# Patient Record
Sex: MALE | Race: WHITE | NOT HISPANIC OR LATINO | URBAN - METROPOLITAN AREA
[De-identification: names, ages, dates, MRNs, and addresses within clinical notes are randomized per-mention and may not be internally consistent; named-entity substitution may affect disease eponyms.]

---

## 2019-05-01 DIAGNOSIS — Z00.00 ROUTINE GENERAL MEDICAL EXAMINATION AT A HEALTH CARE FACILITY: Primary | ICD-10-CM

## 2019-05-10 ENCOUNTER — CLINICAL SUPPORT (OUTPATIENT)
Dept: INTERNAL MEDICINE | Facility: CLINIC | Age: 39
End: 2019-05-10
Payer: COMMERCIAL

## 2019-05-10 ENCOUNTER — HOSPITAL ENCOUNTER (OUTPATIENT)
Dept: RADIOLOGY | Facility: HOSPITAL | Age: 39
Discharge: HOME | End: 2019-05-10
Attending: INTERNAL MEDICINE
Payer: COMMERCIAL

## 2019-05-10 ENCOUNTER — OFFICE VISIT (OUTPATIENT)
Dept: INTERNAL MEDICINE | Facility: CLINIC | Age: 39
End: 2019-05-10
Payer: COMMERCIAL

## 2019-05-10 ENCOUNTER — HOSPITAL ENCOUNTER (OUTPATIENT)
Dept: CARDIOLOGY | Facility: CLINIC | Age: 39
Discharge: HOME | End: 2019-05-10
Payer: COMMERCIAL

## 2019-05-10 VITALS
SYSTOLIC BLOOD PRESSURE: 120 MMHG | HEART RATE: 52 BPM | HEIGHT: 73 IN | BODY MASS INDEX: 25.16 KG/M2 | WEIGHT: 189.81 LBS | OXYGEN SATURATION: 99 % | DIASTOLIC BLOOD PRESSURE: 80 MMHG

## 2019-05-10 DIAGNOSIS — Z00.00 ROUTINE GENERAL MEDICAL EXAMINATION AT A HEALTH CARE FACILITY: Primary | ICD-10-CM

## 2019-05-10 DIAGNOSIS — Z00.00 ROUTINE GENERAL MEDICAL EXAMINATION AT A HEALTH CARE FACILITY: ICD-10-CM

## 2019-05-10 DIAGNOSIS — Z00.00 ANNUAL PHYSICAL EXAM: Primary | ICD-10-CM

## 2019-05-10 LAB
ALBUMIN SERPL BCP-MCNC: 4.4 G/DL (ref 3.5–5.2)
ALP SERPL-CCNC: 64 U/L (ref 55–135)
ALT SERPL W/O P-5'-P-CCNC: 43 U/L (ref 10–44)
ANION GAP SERPL CALC-SCNC: 11 MMOL/L (ref 8–16)
AST SERPL-CCNC: 41 U/L (ref 10–40)
BILIRUB SERPL-MCNC: 2.7 MG/DL (ref 0.1–1)
BUN SERPL-MCNC: 17 MG/DL (ref 6–20)
CALCIUM SERPL-MCNC: 10.3 MG/DL (ref 8.7–10.5)
CHLORIDE SERPL-SCNC: 108 MMOL/L (ref 95–110)
CHOLEST SERPL-MCNC: 222 MG/DL (ref 120–199)
CHOLEST/HDLC SERPL: 3 {RATIO} (ref 2–5)
CO2 SERPL-SCNC: 24 MMOL/L (ref 23–29)
CREAT SERPL-MCNC: 1.2 MG/DL (ref 0.5–1.4)
ERYTHROCYTE [DISTWIDTH] IN BLOOD BY AUTOMATED COUNT: 11.7 % (ref 11.5–14.5)
EST. GFR  (AFRICAN AMERICAN): >60 ML/MIN/1.73 M^2
EST. GFR  (NON AFRICAN AMERICAN): >60 ML/MIN/1.73 M^2
ESTIMATED AVG GLUCOSE: 91 MG/DL (ref 68–131)
GLUCOSE SERPL-MCNC: 89 MG/DL (ref 70–110)
HBA1C MFR BLD HPLC: 4.8 % (ref 4–5.6)
HCT VFR BLD AUTO: 45.4 % (ref 40–54)
HDLC SERPL-MCNC: 74 MG/DL (ref 40–75)
HDLC SERPL: 33.3 % (ref 20–50)
HGB BLD-MCNC: 15.7 G/DL (ref 14–18)
HIV 1+2 AB+HIV1 P24 AG SERPL QL IA: NEGATIVE
LDLC SERPL CALC-MCNC: 132.8 MG/DL (ref 63–159)
MCH RBC QN AUTO: 33.7 PG (ref 27–31)
MCHC RBC AUTO-ENTMCNC: 34.6 G/DL (ref 32–36)
MCV RBC AUTO: 97 FL (ref 82–98)
NONHDLC SERPL-MCNC: 148 MG/DL
PLATELET # BLD AUTO: 223 K/UL (ref 150–350)
PMV BLD AUTO: 9.9 FL (ref 9.2–12.9)
POTASSIUM SERPL-SCNC: 5.2 MMOL/L (ref 3.5–5.1)
PROT SERPL-MCNC: 7.3 G/DL (ref 6–8.4)
RBC # BLD AUTO: 4.66 M/UL (ref 4.6–6.2)
SODIUM SERPL-SCNC: 143 MMOL/L (ref 136–145)
TRIGL SERPL-MCNC: 76 MG/DL (ref 30–150)
TSH SERPL DL<=0.005 MIU/L-ACNC: 1.27 UIU/ML (ref 0.4–4)
WBC # BLD AUTO: 3.18 K/UL (ref 3.9–12.7)

## 2019-05-10 PROCEDURE — 99999 PR PBB SHADOW E&M-EST. PATIENT-LVL III: ICD-10-PCS | Mod: PBBFAC,,, | Performed by: INTERNAL MEDICINE

## 2019-05-10 PROCEDURE — 97750 PHYSICAL PERFORMANCE TEST: CPT | Mod: S$GLB,,, | Performed by: INTERNAL MEDICINE

## 2019-05-10 PROCEDURE — 99999 PR PBB SHADOW E&M-EST. PATIENT-LVL III: CPT | Mod: PBBFAC,,, | Performed by: INTERNAL MEDICINE

## 2019-05-10 PROCEDURE — 99385 PR PREVENTIVE VISIT,NEW,18-39: ICD-10-PCS | Mod: S$PBB,,, | Performed by: INTERNAL MEDICINE

## 2019-05-10 PROCEDURE — 86703 HIV-1/HIV-2 1 RESULT ANTBDY: CPT

## 2019-05-10 PROCEDURE — 85027 COMPLETE CBC AUTOMATED: CPT

## 2019-05-10 PROCEDURE — 93010 EKG 12-LEAD: ICD-10-PCS | Mod: S$PBB,,, | Performed by: INTERNAL MEDICINE

## 2019-05-10 PROCEDURE — 71046 XR CHEST PA AND LATERAL: ICD-10-PCS | Mod: 26,,, | Performed by: RADIOLOGY

## 2019-05-10 PROCEDURE — 80053 COMPREHEN METABOLIC PANEL: CPT

## 2019-05-10 PROCEDURE — 71046 X-RAY EXAM CHEST 2 VIEWS: CPT | Mod: TC,FY

## 2019-05-10 PROCEDURE — 93010 ELECTROCARDIOGRAM REPORT: CPT | Mod: S$PBB,,, | Performed by: INTERNAL MEDICINE

## 2019-05-10 PROCEDURE — 71046 X-RAY EXAM CHEST 2 VIEWS: CPT | Mod: 26,,, | Performed by: RADIOLOGY

## 2019-05-10 PROCEDURE — 97750 PR PHYSICAL PERFORMANCE TEST: ICD-10-PCS | Mod: S$GLB,,, | Performed by: INTERNAL MEDICINE

## 2019-05-10 PROCEDURE — 97802 PR MED NUTR THER, 1ST, INDIV, EA 15 MIN: ICD-10-PCS | Mod: S$GLB,,, | Performed by: INTERNAL MEDICINE

## 2019-05-10 PROCEDURE — 83036 HEMOGLOBIN GLYCOSYLATED A1C: CPT

## 2019-05-10 PROCEDURE — 99385 PREV VISIT NEW AGE 18-39: CPT | Mod: S$PBB,,, | Performed by: INTERNAL MEDICINE

## 2019-05-10 PROCEDURE — 80061 LIPID PANEL: CPT

## 2019-05-10 PROCEDURE — 93005 ELECTROCARDIOGRAM TRACING: CPT | Mod: PBBFAC | Performed by: INTERNAL MEDICINE

## 2019-05-10 PROCEDURE — 84443 ASSAY THYROID STIM HORMONE: CPT

## 2019-05-10 PROCEDURE — 36415 COLL VENOUS BLD VENIPUNCTURE: CPT

## 2019-05-10 PROCEDURE — 97802 MEDICAL NUTRITION INDIV IN: CPT | Mod: S$GLB,,, | Performed by: INTERNAL MEDICINE

## 2019-05-10 NOTE — PROGRESS NOTES
Subjective:       Patient ID: Yariel Begum is a 38 y.o. male.    Chief Complaint: No chief complaint on file.    HPI   Pt. Has no significant cardiovascular or pulmonary history.    Physical Limitations:  Patient has been diagnosed with low back DDD.  Patient stated that this issue only limits him when he is not active.      Current exercise routine:  Patient currently attends a Body Pump class where he performs aerobic and full-body resistance training exercises, 2-3 days a week.  Patient does not follow any formal flexibility routine at the current time.    Goals:  Patient would like to maintain his weight and body fat % while increasing his flexibility.  Fun Facts:  Patient was very friendly and engaged.  Patient stated that he saw someone named Bill for fitness in 2012.  Patient asked questions and was very receptive to all recommendations made.      Review of Systems    Objective:     The fitness evaluation results are as follows:  D.O.S. 5/10/2019   Height (in): 72.5   Weight (lbs): 185   BMI: 24.006863   Body Fat (%): 13.12   Waist (cm): 82   Hip (cm): 105   WHR: 0.78   RBP (mmHg): 110/76   RHR (bpm): 54    Strength R (lbs)t: 136.72391    Strength Lt (lbs): 128.53637   Push-up Assessment: 38   Curl-up Assessment: 35   Flexibility Testing (cm): 15   REE (kcals): 2130       Physical Exam    Assessment:     Age/gender stratified assessment:  Resting BP: Within Normal Limits   Body Fat %: Excellent   WHR Risk Factor: Low Risk    Strength R: Average    Strength L: Average   Upper Body Endurance: Excellent   Abdominal Endurance: Average   Lower body Flexibiltiy: Needs Improvement       1. Routine general medical examination at a health care facility        Plan:       Recommended fitness guidelines:    -150 minutes of moderate intensity aerobic exercise per week or 75 minutes of vigorous intensity aerobic exercise per week.   Try to reach a minimum of 150 minutes of moderate to vigorous  intensity aerobic activity per week and 10,000 steps per day.  Incorporate some interval training to increase your heart rate for short periods of time.      -2 to 4 days per week of resistance training for each muscle group.      -Daily stretching with a hold of at least 30 seconds per muscle group.  Practice the seated hamstring stretch, demonstrated during the evaluation, daily.

## 2019-05-10 NOTE — PATIENT INSTRUCTIONS
Here are the results of your recent labs and tests.   Any significant abnormalities have been discussed and those recommendations are found in the diagnosis list on this visit summary.    Your Chest xray was: Normal  Your EKG was normal and did not show any concerning changes.     Recent Results (from the past 168 hour(s))   Comprehensive metabolic panel    Collection Time: 05/10/19  7:51 AM   Result Value Ref Range    Sodium 143 136 - 145 mmol/L    Potassium 5.2 (H) 3.5 - 5.1 mmol/L    Chloride 108 95 - 110 mmol/L    CO2 24 23 - 29 mmol/L    Glucose 89 70 - 110 mg/dL    BUN, Bld 17 6 - 20 mg/dL    Creatinine 1.2 0.5 - 1.4 mg/dL    Calcium 10.3 8.7 - 10.5 mg/dL    Total Protein 7.3 6.0 - 8.4 g/dL    Albumin 4.4 3.5 - 5.2 g/dL    Total Bilirubin 2.7 (H) 0.1 - 1.0 mg/dL    Alkaline Phosphatase 64 55 - 135 U/L    AST 41 (H) 10 - 40 U/L    ALT 43 10 - 44 U/L    Anion Gap 11 8 - 16 mmol/L    eGFR if African American >60.0 >60 mL/min/1.73 m^2    eGFR if non African American >60.0 >60 mL/min/1.73 m^2   CBC Without Differential    Collection Time: 05/10/19  7:51 AM   Result Value Ref Range    WBC 3.18 (L) 3.90 - 12.70 K/uL    RBC 4.66 4.60 - 6.20 M/uL    Hemoglobin 15.7 14.0 - 18.0 g/dL    Hematocrit 45.4 40.0 - 54.0 %    Mean Corpuscular Volume 97 82 - 98 fL    Mean Corpuscular Hemoglobin 33.7 (H) 27.0 - 31.0 pg    Mean Corpuscular Hemoglobin Conc 34.6 32.0 - 36.0 g/dL    RDW 11.7 11.5 - 14.5 %    Platelets 223 150 - 350 K/uL    MPV 9.9 9.2 - 12.9 fL   Lipid panel    Collection Time: 05/10/19  7:51 AM   Result Value Ref Range    Cholesterol 222 (H) 120 - 199 mg/dL    Triglycerides 76 30 - 150 mg/dL    HDL 74 40 - 75 mg/dL    LDL Cholesterol 132.8 63.0 - 159.0 mg/dL    Hdl/Cholesterol Ratio 33.3 20.0 - 50.0 %    Total Cholesterol/HDL Ratio 3.0 2.0 - 5.0    Non-HDL Cholesterol 148 mg/dL   Hemoglobin A1c    Collection Time: 05/10/19  7:51 AM   Result Value Ref Range    Hemoglobin A1C 4.8 4.0 - 5.6 %    Estimated Avg Glucose  91 68 - 131 mg/dL   TSH    Collection Time: 05/10/19  7:51 AM   Result Value Ref Range    TSH 1.270 0.400 - 4.000 uIU/mL

## 2019-05-10 NOTE — LETTER
May 10, 2019     Yariel Begum  [address] [place] [address]    Dear Mr. Begum    Thank you for allowing me to serve you and perform your Executive Health exam on 5/10/2019.  Attached to this letter you will find a copy of your After Visit Summary which includes a copy of all labs and other results, your current medical problem list (if any), a list of all diagnoses from today (if any), a list of all current medication, your vital signs (including Blood pressure, Height, weight, BMI) and associated plans/goals that we discussed while you were in the office.     If you have any questions please feel free to contact the office.             LANE Hma II, MD

## 2019-05-10 NOTE — PROGRESS NOTES
"  Yariel Begum is a 38 y.o. White male who presents for an Executive health visit with no  other complaints today.   All chronic problems as listed in the active problem list have been stable and well controlled recently. The active problem list was reviewed and reconciled today and is up to date as of today.  There is no problem list on file for this patient.     He denies any recent ER visits or hospitalizations.     He states that he is compliant with all prescribed medications and he has experienced no side effects. I have reviewed all current medications and updated the current medication list during this encounter.     PMFSH: all information reviewed and updated today.     All labs and tests from earlier today reviewed. Abnormalities (if any) are addressed in the assessment and plan.     Review of Systems   Constitutional: Negative for activity change and unexpected weight change.   Eyes: Negative for visual disturbance.   Cardiovascular: Negative for chest pain, palpitations and leg swelling.   Gastrointestinal: Negative for constipation.   Neurological: Negative for dizziness, light-headedness and headaches.   All other systems reviewed and are negative.    Vitals:    05/10/19 0842   BP: 120/80   BP Location: Right arm   Patient Position: Sitting   BP Method: Large (Manual)   Pulse: (!) 52   SpO2: 99%   Weight: 86.1 kg (189 lb 13.1 oz)   Height: 6' 1" (1.854 m)    Body mass index is 25.04 kg/m².    Physical Exam   Constitutional: He is oriented to person, place, and time. He appears well-developed and well-nourished.   HENT:   Head: Normocephalic and atraumatic.   Right Ear: External ear normal.   Left Ear: External ear normal.   Nose: Nose normal.   Mouth/Throat: Oropharynx is clear and moist.   Eyes: Pupils are equal, round, and reactive to light. Conjunctivae and EOM are normal.   Neck: Normal range of motion. Neck supple. No JVD present. No thyromegaly present.   Cardiovascular: Normal rate, " regular rhythm and normal heart sounds.   Pulmonary/Chest: Effort normal and breath sounds normal. He has no wheezes.   Abdominal: Soft. Bowel sounds are normal. He exhibits no distension and no mass. There is no tenderness.   Musculoskeletal: Normal range of motion. He exhibits no edema or tenderness.   Lymphadenopathy:     He has no cervical adenopathy.   Neurological: He is alert and oriented to person, place, and time. He has normal reflexes.   Skin: Skin is warm and dry. No rash noted.   Psychiatric: He has a normal mood and affect. His behavior is normal.   Vitals reviewed.    ASSESSMENT/PLAN:  1. Routine general medical examination at a health care facility  Healthy 38 y.o. White male with no medical problems.  All labs are normal.  F/u as needed.

## 2019-05-10 NOTE — PROGRESS NOTES
"Nutrition Assessment  Client name:  Yariel Begum  :  1980  Age:  38 y.o.  Gender:  male    Client states:  [employer] employee here for his annual Executive Health physical.  Last EH physical was over five years ago.  Originally from [place] although moved to [place] for employment.  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  Has an unremarkable PMH with the exception of "broken bones."  Has undergone surgery on his hand and most recently, his wrist.  Maintains an active lifestyle, sharing jimmy of mountain bike riding.  Currently, participates in Body Pump classes 2x weekly although plans to increase frequency when physically able.  Shares that his eating habits are somewhat "boring" as he eats the same foods every work day, including 7 eggs, 12 grain bread, deli meat, olive oil, Joel Chachere's seasoning, coffee, water, and beer.  Considers it to be "easy" yet was unaware of its cholesterol content.  Also, prepares frozen vegetables at times, inquiring of their health benefits.  Overall, has no specific nutrition-related goals.    *Patient's preferred name is Gianni.      Anthropometrics  Height:  6' 0.5"     Weight:  185#  BMI:  24.7  % Body Fat:  13.12%    Clinical Signs/Symptoms  N/V/D:  None  Appetite (Good, Fair, or Poor):  Good      No past medical history on file.    Past Surgical History:   Procedure Laterality Date    CLOSED REDUCTION HAND FRACTURE      GANGLION CYST EXCISION Left        Medications    currently has no medications in their medication list.    Vitamins, Minerals, and/or Supplements:  BCAA, L-glutamine      Food/Medication Interactions:  Reviewed     Food Allergies or Intolerances:  NKFA     Social History    Marital status:  Single  Employment:  [employer]    Social History     Tobacco Use    Smoking status: Former Smoker     Types: Cigarettes    Smokeless tobacco: Never Used    Tobacco comment: only one-2 years while in " highschool   Substance Use Topics    Alcohol use: Yes     Comment: social        Lab Reports   Total Cholesterol:  222    Triglycerides:  76  HDL:  74  LDL:  132.8   Glucose:  89  HbA1c:  4.8%  BP:  120/80     Food History  Breakfast:  3 eggs + 1 slice 12 grain bread + coffee  Mid-morning Snack:  None  Lunch:  Deli meat sandwich on 12 grain bread + water + coffee  Mid-afternoon Snack:  None  Dinner:  4 eggs + 2 slices 12 grain bread + water + beer  H.S. Snack:  None  *Fluid intake:  Coffee, water, beer    Exercise History:  Body Pump class 2x/week    Cultural/Spiritual/Personal Preferences:  None identified    Support System:  Friends    State of Change:  Contemplation    Barriers to Change:  None    Diagnosis    Excessive cholesterol intake related to excessive egg intake as evidenced by TC:  222; LDL:  132.8.    Intervention    RMR (Method:  Body Brule):  2130 kcal  Activity Factor:  1.3  JAYDEN:  2769 kcal    Goals:  1.  TC < 200; LDL < 130  2.  Limit cholesterol intake to 200-300 mg daily  3.  Reduce intake of egg yolks by replacing eggs with Egg Beaters at dinner  4.  Switch to low sodium deli meat  5.  Gradually increase exercise to 150 minutes/week as tolerated    Nutrition Education  Reviewed CMP, lipid panel, and HbA1c, noting borderline high TC and LDL albeit elevated HDL, resulting in TC/HDL ratio < 4.  Explained such to patient in addition to factors affecting lipid panel.  Encouraged greater physical activity when cleared for maintenance of HDL values as well as selection of healthy, unsaturated fats in lieu of saturated and trans fats.  Provided handout outlining food sources of both and discussed.  Discussed recommended cholesterol intake, food sources of cholesterol, and AHA's recommendations re: egg yolks in relation to heart health as current food recall revealed intake of >1200 mg of cholesterol.  Suggested replacement of whole eggs with either egg whites and/or egg substitutes for reduced cholesterol  content as patient prefers to continue eating eggs in some form due to ease and convenience.  Also, encouraged selection of low sodium deli meat for reduced sodium and fat intake.  Stressed the overall importance of a heart-healthy lifestyle inclusive of proper nutrition, physical activity, and weight management.      Patient verbalized understanding of nutrition education and recommendations received.    Handouts Provided  Meal Planning Guide  Restaurant Guide  Eat Fit Shopping List  Eat Fit Kalpana  Fast Food Guide  Vitamin/Mineral Guide  Choosing Heart-Healthy Fats    Monitoring/Evaluation    Monitor the following:  Weight  BMI  Caloric intake  Lipid panel    Follow Up Plan:  Communication with referring healthcare provider is unnecessary at this time as patient presented as part of annual wellness exam.  However, will follow up with patient in 1-2 years.

## 2020-01-24 ENCOUNTER — OFFICE VISIT (OUTPATIENT)
Dept: URGENT CARE | Facility: CLINIC | Age: 40
End: 2020-01-24
Payer: COMMERCIAL

## 2020-01-24 VITALS
BODY MASS INDEX: 25.18 KG/M2 | RESPIRATION RATE: 19 BRPM | OXYGEN SATURATION: 100 % | WEIGHT: 190 LBS | DIASTOLIC BLOOD PRESSURE: 84 MMHG | TEMPERATURE: 98 F | SYSTOLIC BLOOD PRESSURE: 132 MMHG | HEIGHT: 73 IN | HEART RATE: 57 BPM

## 2020-01-24 DIAGNOSIS — K11.8 MASS OF SALIVARY GLAND: Primary | ICD-10-CM

## 2020-01-24 PROCEDURE — 99203 PR OFFICE/OUTPT VISIT, NEW, LEVL III, 30-44 MIN: ICD-10-PCS | Mod: S$GLB,,, | Performed by: NURSE PRACTITIONER

## 2020-01-24 PROCEDURE — 99203 OFFICE O/P NEW LOW 30 MIN: CPT | Mod: S$GLB,,, | Performed by: NURSE PRACTITIONER

## 2020-01-24 NOTE — PROGRESS NOTES
"Subjective:       Patient ID: Yariel Begum is a 39 y.o. male.    Vitals:  height is 6' 1" (1.854 m) and weight is 86.2 kg (190 lb). His oral temperature is 98 °F (36.7 °C). His blood pressure is 132/84 and his pulse is 57 (abnormal). His respiration is 19 and oxygen saturation is 100%.     Chief Complaint: Cyst    Pt c/o sublingual mass onset ~1 month. Pt reports no pain and denies any increase in size/discharge.    Cyst   This is a new problem. The current episode started more than 1 month ago (one month ago). The problem occurs constantly. The problem has been unchanged. Pertinent negatives include no arthralgias, chest pain, chills, congestion, coughing, fatigue, fever, headaches, joint swelling, myalgias, nausea, rash, sore throat, vertigo or vomiting. Nothing aggravates the symptoms. He has tried nothing for the symptoms.       Constitution: Negative for chills, fatigue and fever.   HENT: Negative for congestion and sore throat.    Neck: Negative for painful lymph nodes.   Cardiovascular: Negative for chest pain and leg swelling.   Eyes: Negative for double vision and blurred vision.   Respiratory: Negative for cough and shortness of breath.    Gastrointestinal: Negative for nausea, vomiting and diarrhea.   Genitourinary: Negative for dysuria, frequency and urgency.   Musculoskeletal: Negative for joint pain, joint swelling, muscle cramps and muscle ache.   Skin: Positive for abscess. Negative for color change, pale and rash.   Allergic/Immunologic: Negative for seasonal allergies.   Neurological: Negative for dizziness, history of vertigo, light-headedness, passing out and headaches.   Hematologic/Lymphatic: Negative for swollen lymph nodes, easy bruising/bleeding and history of blood clots. Does not bruise/bleed easily.   Psychiatric/Behavioral: Negative for nervous/anxious, sleep disturbance and depression. The patient is not nervous/anxious.        Objective:      Physical Exam   Constitutional: " He is oriented to person, place, and time. He appears well-developed and well-nourished. He is cooperative.  Non-toxic appearance. He does not have a sickly appearance. He does not appear ill. No distress.   HENT:   Head: Normocephalic and atraumatic.   Right Ear: Hearing, tympanic membrane, external ear and ear canal normal.   Left Ear: Hearing, tympanic membrane, external ear and ear canal normal.   Nose: Nose normal. No mucosal edema, rhinorrhea or nasal deformity. No epistaxis. Right sinus exhibits no maxillary sinus tenderness and no frontal sinus tenderness. Left sinus exhibits no maxillary sinus tenderness and no frontal sinus tenderness.   Mouth/Throat: Uvula is midline, oropharynx is clear and moist and mucous membranes are normal. Oral lesions present. No trismus in the jaw. Normal dentition. No uvula swelling. No oropharyngeal exudate, posterior oropharyngeal edema or posterior oropharyngeal erythema.       Eyes: Pupils are equal, round, and reactive to light. Conjunctivae, EOM and lids are normal. No scleral icterus.   Neck: Trachea normal, normal range of motion, full passive range of motion without pain and phonation normal. Neck supple. No neck rigidity. No edema and no erythema present.   Cardiovascular: Normal rate, regular rhythm, normal heart sounds, intact distal pulses and normal pulses.   Pulmonary/Chest: Effort normal and breath sounds normal. No respiratory distress. He has no decreased breath sounds. He has no rhonchi.   Abdominal: Normal appearance.   Musculoskeletal: Normal range of motion. He exhibits no edema or deformity.   Neurological: He is alert and oriented to person, place, and time. He exhibits normal muscle tone. Coordination normal.   Skin: Skin is warm, dry, intact, not diaphoretic and not pale.   Psychiatric: He has a normal mood and affect. His speech is normal and behavior is normal. Judgment and thought content normal. Cognition and memory are normal.   Nursing note and  vitals reviewed.        Assessment:       1. Mass of salivary gland        Plan:         Mass of salivary gland  -     Ambulatory referral to ENT

## 2020-01-24 NOTE — PATIENT INSTRUCTIONS
Follow-up with ENT as directed.  Salivary Gland Swelling, Uncertain Cause  Salivary glands make saliva in response to food in your mouth. Saliva is mostly water. It also has minerals and proteins that help break down food and keep the mouth and teeth healthy. There are three pairs of salivary glands:  · Parotid glands (in front of the ear)  · Submandibular glands (below the jaw)  · Sublingual glands (below the tongue)  Each gland has a duct (channel) that carries saliva from the gland into the mouth.   Swelling of the salivary glands can sometimes occur. Causes can include:  · Viral infection (such as childhood mumps)  · Bacterial infections  · Sjögren's syndrome  · Diabetes  · Malnutrition  · Sarcoidosis  · Blockage of the salivary duct (from stones or tumors)  Certain medicines can affect salivary flow. This can lead to swelling of the gland. Be sure to tell your healthcare provider about all of the medicines you take.  Tests are being done to determine the cause of the swelling. These may include blood tests, X-ray, ultrasound, CT scan, or injection of dye into the duct to look for blockage. Treatment depends on the exact cause of the swelling.  Home care  · If the area is painful, you can take over-the-counter medicines, such as acetaminophen or ibuprofen, unless you were prescribed another medicine. Wetting a cloth with warm water and putting it over the affected gland for 10-15 minutes at a time can also help ease pain.  · To help prevent blockages and infections:  ¨ Drink 6-8 glasses of fluid per day (such as water, tea, and clear soup) to keep well hydrated.  ¨ If you smoke, ask your healthcare provider for help to quit. Smoking makes salivary gland stones more likely.  ¨ Maintain good dental hygiene. Brush and floss your teeth daily. See your dentist for regular cleanings.  Follow-up care  Follow up with your healthcare provider or as advised. See your healthcare provider for further exams and testing. If  you have been referred to a specialist, make an appointment promptly.  When to seek medical advice  Call your healthcare provider if any of the following occur:  · Increasing pain or swelling in the gland  · Inability to open mouth or pain when opening mouth  · Fever of 100.4°F (38ºC) or higher, or as directed by your healthcare provider  · Redness over the gland  · Pus draining into the mouth  · Trouble breathing or swallowing  · Any new symptoms  Date Last Reviewed: 5/4/2015  © 9394-7689 [employer]. [address] [place] [address]. All rights reserved. This information is not intended as a substitute for professional medical care. Always follow your healthcare professional's instructions.        Urgent Care Management:  - Treatment plan discussed.  - PCP recommendations given.  - Return precautions advised.  - Patient agrees with and understands plan of care.    Patient Instructions, Education, Teaching and Summary of Visit:      RETURN TO CLINIC IF SYMPTOMS WORSEN OR CALL 911 IMMEDIATELY FOR SHORTNESS OF BREATH, CHEST PAIN, DIZZINESS, WORSENING PAIN, NAUSEA AND VOMITING, HEART PALPITATIONS, FEVER AND/OR NECK STIFFNESS. FOLLOW UP WITH PRIMARY CARE PROVIDER IN THE AM.    -Diagnosis and treatment plan discussed with patient.  -Patient agreed with my treatment plan.  -Patient will follow up with primary care provider or Specialty Provider, as discussed.     -If you were prescribed a narcotic or controlled medication, do not drive or operate heavy equipment or machinery while taking these medications.  -You must understand that you've received an Urgent Care treatment only and that you may be released before all your medical problems are known or treated.   -You, the patient, will arrange for follow up care as instructed.  -Follow up with your PCP or specialty clinic as directed in the next 1-2 weeks if not improved or as needed.    -You can call [phone] to schedule an appointment with  the appropriate provider.  -If your condition worsens we recommend that you receive another evaluation at the emergency room immediately or contact your primary medical clinics after hours call service to discuss your concerns.  -Please return here or go to the Emergency Department for any concerns or worsening of condition.

## 2020-02-05 ENCOUNTER — OFFICE VISIT (OUTPATIENT)
Dept: OTOLARYNGOLOGY | Facility: CLINIC | Age: 40
End: 2020-02-05
Payer: COMMERCIAL

## 2020-02-05 DIAGNOSIS — K11.5 SALIVARY DUCT STONE: Primary | ICD-10-CM

## 2020-02-05 PROCEDURE — 99202 OFFICE O/P NEW SF 15 MIN: CPT | Mod: S$GLB,,, | Performed by: NURSE PRACTITIONER

## 2020-02-05 PROCEDURE — 99999 PR PBB SHADOW E&M-EST. PATIENT-LVL II: ICD-10-PCS | Mod: PBBFAC,,, | Performed by: NURSE PRACTITIONER

## 2020-02-05 PROCEDURE — 99999 PR PBB SHADOW E&M-EST. PATIENT-LVL II: CPT | Mod: PBBFAC,,, | Performed by: NURSE PRACTITIONER

## 2020-02-05 PROCEDURE — 99202 PR OFFICE/OUTPT VISIT, NEW, LEVL II, 15-29 MIN: ICD-10-PCS | Mod: S$GLB,,, | Performed by: NURSE PRACTITIONER

## 2020-02-05 NOTE — LETTER
February 5, 2020      Catalino Kebede, MARTA  [address]  [place] [address]           [facility]hin[facility]  [address] [facility] [address]  Phone: [phone]  Fax: [phone]          Patient: Yariel Begum   MR Number: [id number]   YOB: 1980   Date of Visit: 2/5/2020       Dear Catalino Kebede:    Thank you for referring Yariel Begum to me for evaluation. Attached you will find relevant portions of my assessment and plan of care.    If you have questions, please do not hesitate to call me. I look forward to following Yariel Begum along with you.    Sincerely,    Sanjana Lawler NP    Enclosure  CC:  No Recipients    If you would like to receive this communication electronically, please contact [email or web address] or [phone] to request more information on [employer] access.    For providers and/or their staff who would like to refer a patient to [facility], please contact us through our one-stop-shop provider referral line, [facility], at [phone].    If you feel you have received this communication in error or would no longer like to receive these types of communications, please e-mail [email or web address]

## 2020-02-05 NOTE — PROGRESS NOTES
Subjective:      Yariel Begum is a 39 y.o. male who was referred to me by Catalino Kebede in consultation for salivary mass.    Mr. Begum reports feeling a bump under his tongue for the past month. He denies pain, difficulty with swallowing or eating. He denies tongue swelling. He state lump under his tongue is noticeable and sometimes bothersome. Nothing has made his symptoms better or worse.    Past Medical History  He has no past medical history on file.    Past Surgical History  He has a past surgical history that includes Closed reduction hand fracture and Ganglion cyst excision (Left).    Family History  His family history includes Breast cancer (age of onset: 45) in his maternal aunt; Breast cancer (age of onset: 65) in his mother; Diabetes in his father; Hypertension in his father; Rheum arthritis in his mother.    Social History  He reports that he has quit smoking. His smoking use included cigarettes. He has never used smokeless tobacco. He reports that he drinks alcohol.    Allergies  He has No Known Allergies.    Medications  He currently has no medications in their medication list.    Review of Systems   Constitutional: Negative for chills, fatigue and fever.   HENT: Negative for congestion, facial swelling, nosebleeds, postnasal drip, rhinorrhea, sinus pressure, sinus pain, sneezing, sore throat and tinnitus.         Lump under tongue   Eyes: Negative for photophobia, redness, itching and visual disturbance.   Respiratory: Negative for apnea, cough, shortness of breath, wheezing and stridor.    Cardiovascular: Negative for chest pain and palpitations.   Gastrointestinal: Negative for diarrhea, nausea and vomiting.   Endocrine: Negative.    Genitourinary: Negative for decreased urine volume, dysuria and frequency.   Musculoskeletal: Negative for arthralgias, myalgias and neck stiffness.   Skin: Negative for rash and wound.   Allergic/Immunologic: Negative for environmental allergies,  food allergies and immunocompromised state.   Neurological: Negative for dizziness, syncope, weakness, light-headedness and headaches.   Hematological: Negative for adenopathy. Does not bruise/bleed easily.   Psychiatric/Behavioral: Negative for confusion, decreased concentration and sleep disturbance.          Objective:     There were no vitals taken for this visit.     Constitutional:   He is oriented to person, place, and time. Vital signs are normal. He appears well-developed and well-nourished. He appears alert. Normal speech.      Head:  Normocephalic and atraumatic.     Ears:    Right Ear: No lacerations. No drainage, swelling or tenderness. No foreign bodies. No mastoid tenderness. Tympanic membrane is not injected, not scarred, not perforated, not erythematous, not retracted and not bulging. Tympanic membrane mobility is normal. No middle ear effusion. No hemotympanum.   Left Ear: No lacerations. No drainage, swelling or tenderness. No foreign bodies. No mastoid tenderness. Tympanic membrane is not injected, not scarred, not perforated, not erythematous, not retracted and not bulging. Tympanic membrane mobility is normal.  No middle ear effusion. No hemotympanum.     Nose:  Nose normal including turbinates, nasal mucosa, sinuses and nasal septum. Mucosal edema and rhinorrhea present. No nose lacerations, sinus tenderness, septal deviation, nasal septal hematoma or polyps. No epistaxis.  No foreign bodies. Right sinus exhibits no maxillary sinus tenderness and no frontal sinus tenderness. Left sinus exhibits no maxillary sinus tenderness and no frontal sinus tenderness.     Mouth/Throat  Oropharynx clear and moist without lesions or asymmetry, normal uvula midline and lips, teeth, and gums normal. No uvula swelling, oral lesions, trismus, mucous membrane lesions or xerostomia. No oropharyngeal exudate or posterior oropharyngeal erythema.         Neck:  Neck normal without thyromegaly masses, asymmetry,  normal tracheal structure, crepitus, and tenderness and no adenopathy.     Psychiatric:   He has a normal mood and affect. His speech is normal and behavior is normal.     Neurological:   He is alert and oriented to person, place, and time. No cranial nerve deficit.     Skin:   No abrasions, lacerations, lesions, or rashes.       Procedure    None      Data Reviewed    WBC (K/uL)   Date Value   05/10/2019 3.18 (L)     Eosinophil% (%)   Date Value   05/30/2012 8.3 (H)     Eos # (K/uL)   Date Value   05/30/2012 0.3     Platelets (K/uL)   Date Value   05/10/2019 223     Glucose (mg/dL)   Date Value   05/10/2019 89           Assessment:     1. Salivary duct stone         Plan:        I had a long discussion with the patient regarding his condition and the further workup and management options.    Mr. Begum has a sublingual salivary duct stone.  I was unable to extract stone.   I recommend lemon drops which may help push stone out.  I placed referral to [facility] for evaluation and stone extraction.  Follow up with me as needed.